# Patient Record
Sex: MALE
[De-identification: names, ages, dates, MRNs, and addresses within clinical notes are randomized per-mention and may not be internally consistent; named-entity substitution may affect disease eponyms.]

---

## 2023-06-05 ENCOUNTER — APPOINTMENT (OUTPATIENT)
Dept: CT IMAGING | Facility: CLINIC | Age: 68
End: 2023-06-05
Payer: MEDICARE

## 2023-06-05 PROCEDURE — 75574 CT ANGIO HRT W/3D IMAGE: CPT

## 2023-11-28 ENCOUNTER — APPOINTMENT (OUTPATIENT)
Dept: ORTHOPEDIC SURGERY | Facility: CLINIC | Age: 68
End: 2023-11-28
Payer: MEDICARE

## 2023-11-28 VITALS — HEIGHT: 71 IN | WEIGHT: 205 LBS | BODY MASS INDEX: 28.7 KG/M2

## 2023-11-28 DIAGNOSIS — Z00.00 ENCOUNTER FOR GENERAL ADULT MEDICAL EXAMINATION W/OUT ABNORMAL FINDINGS: ICD-10-CM

## 2023-11-28 PROCEDURE — 20610 DRAIN/INJ JOINT/BURSA W/O US: CPT | Mod: RT

## 2023-11-28 PROCEDURE — 73030 X-RAY EXAM OF SHOULDER: CPT | Mod: RT

## 2023-11-28 PROCEDURE — 99204 OFFICE O/P NEW MOD 45 MIN: CPT | Mod: 25

## 2024-01-02 ENCOUNTER — APPOINTMENT (OUTPATIENT)
Dept: ORTHOPEDIC SURGERY | Facility: CLINIC | Age: 69
End: 2024-01-02
Payer: MEDICARE

## 2024-01-02 VITALS — BODY MASS INDEX: 28.7 KG/M2 | HEIGHT: 71 IN | WEIGHT: 205 LBS

## 2024-01-02 PROCEDURE — 99213 OFFICE O/P EST LOW 20 MIN: CPT

## 2024-01-02 RX ORDER — MELOXICAM 15 MG/1
15 TABLET ORAL
Qty: 60 | Refills: 1 | Status: ACTIVE | COMMUNITY
Start: 2024-01-02 | End: 1900-01-01

## 2024-01-02 NOTE — DISCUSSION/SUMMARY
[de-identified] : 68-year-old male right shoulder pain and weakness.  History of rotator cuff repair in the past.  He has had extensive conservative care consisting of physical therapy exercises anti-inflammatory medications for greater than 6 weeks of the last 3 months.  He had a recent corticosteroid injection without relief in November 2023.  Will obtain an MRI to evaluate his rotator cuff he will follow-up after MRI.  All questions were answered

## 2024-01-02 NOTE — PHYSICAL EXAM
[de-identified] : Right shoulder exam  Inspection: No swelling, ecchymosis or gross deformity. Skin: No masses, No lesions Tenderness: No bicipital tenderness, no tenderness to the greater tuberosity/RTC insertion, no anterior shoulder/lesser tuberosity tenderness. No tenderness SC joint, clavicle, AC joint. ROM: 150/60/T6 Impingement tests: Positive Carvajal AC Joint: no pain with cross arm testing Biceps: Negative speed Strength: 5-/5 abduction, 5/5 external rotation, and internal rotation Neuro: AIN, PIN, Ulnar nerve motor intact Sensation: Intact to light touch in radial, median, ulnar, and axillary nerve distributions Vasc: 2+ radial pulse [de-identified] : Prior radiographs reviewed normal alignment no fracture of the right shoulder

## 2024-01-02 NOTE — HISTORY OF PRESENT ILLNESS
[de-identified] : 69yo male presents complaining of right shoulder pain for couple months.  No specific injuries.  He states he works out often and enjoys exercising.  He has pain anterior lateral shoulder.  Worse with overhead activity reaching behind back.  He is taking ibuprofen without relief.  He takes Tylenol which does help.  He states is unable to do frontal lateral raises due to symptoms.  Denies numbness tingling He reports rotator cuff surgery in 2019  He underwent a corticosteroid injection 6 weeks ago and has been doing physical therapy exercises.  His pain is unchanged.

## 2024-01-16 ENCOUNTER — APPOINTMENT (OUTPATIENT)
Dept: ORTHOPEDIC SURGERY | Facility: CLINIC | Age: 69
End: 2024-01-16
Payer: MEDICARE

## 2024-01-16 DIAGNOSIS — M75.121 COMPLETE ROTATOR CUFF TEAR OR RUPTURE OF RIGHT SHOULDER, NOT SPECIFIED AS TRAUMATIC: ICD-10-CM

## 2024-01-16 PROCEDURE — 99214 OFFICE O/P EST MOD 30 MIN: CPT

## 2024-01-16 RX ORDER — DICLOFENAC SODIUM 75 MG/1
75 TABLET, DELAYED RELEASE ORAL
Qty: 1 | Refills: 2 | Status: ACTIVE | COMMUNITY
Start: 2024-01-16 | End: 1900-01-01

## 2024-01-16 NOTE — HISTORY OF PRESENT ILLNESS
[de-identified] : 67yo male presents complaining of right shoulder pain for couple months.  No specific injuries.  He states he works out often and enjoys exercising.  He has pain anterior lateral shoulder.  Worse with overhead activity reaching behind back.  He is taking ibuprofen without relief.  He takes Tylenol which does help.  He states is unable to do frontal lateral raises due to symptoms.  Denies numbness tingling He reports rotator cuff surgery in 2019  He underwent a corticosteroid injection 6 weeks ago and has been doing physical therapy exercises.  His pain is unchanged. A MRI was done since last visit, here to review results today.

## 2024-01-16 NOTE — PHYSICAL EXAM
[de-identified] : Right shoulder exam  Inspection: No swelling, ecchymosis or gross deformity. Skin: No masses, No lesions Tenderness: No bicipital tenderness, no tenderness to the greater tuberosity/RTC insertion, no anterior shoulder/lesser tuberosity tenderness. No tenderness SC joint, clavicle, AC joint. ROM: 150/60/T6 Impingement tests: Positive Carvajal AC Joint: no pain with cross arm testing Biceps: Negative speed Strength: 5-/5 abduction, 5/5 external rotation, and internal rotation Neuro: AIN, PIN, Ulnar nerve motor intact Sensation: Intact to light touch in radial, median, ulnar, and axillary nerve distributions Vasc: 2+ radial pulse [de-identified] : MRI reviewed right shoulder.  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  There is articular sided tearing of the subscapularis tendon.  There is mild atrophy.  There is mild labral tearing arthrosis

## 2025-06-30 ENCOUNTER — APPOINTMENT (OUTPATIENT)
Dept: ORTHOPEDIC SURGERY | Facility: CLINIC | Age: 70
End: 2025-06-30
Payer: MEDICARE

## 2025-06-30 VITALS — BODY MASS INDEX: 28.7 KG/M2 | HEIGHT: 71 IN | WEIGHT: 205 LBS

## 2025-06-30 PROBLEM — M54.16 LUMBAR RADICULOPATHY: Status: ACTIVE | Noted: 2025-06-30

## 2025-06-30 PROCEDURE — 99204 OFFICE O/P NEW MOD 45 MIN: CPT

## 2025-06-30 PROCEDURE — 72100 X-RAY EXAM L-S SPINE 2/3 VWS: CPT

## 2025-06-30 RX ORDER — NAPROXEN 500 MG/1
500 TABLET ORAL
Qty: 60 | Refills: 0 | Status: ACTIVE | COMMUNITY
Start: 2025-06-30 | End: 1900-01-01

## 2025-07-29 ENCOUNTER — RX RENEWAL (OUTPATIENT)
Age: 70
End: 2025-07-29

## 2025-08-26 ENCOUNTER — RX RENEWAL (OUTPATIENT)
Age: 70
End: 2025-08-26